# Patient Record
Sex: MALE | Race: WHITE | NOT HISPANIC OR LATINO | URBAN - METROPOLITAN AREA
[De-identification: names, ages, dates, MRNs, and addresses within clinical notes are randomized per-mention and may not be internally consistent; named-entity substitution may affect disease eponyms.]

---

## 2021-06-11 ENCOUNTER — IMMUNIZATION (OUTPATIENT)
Dept: PRIMARY CARE CLINIC | Facility: CLINIC | Age: 46
End: 2021-06-11

## 2021-06-11 DIAGNOSIS — Z23 NEED FOR VACCINATION: Primary | ICD-10-CM

## 2021-06-11 PROCEDURE — 91300 COVID-19, MRNA, LNP-S, PF, 30 MCG/0.3 ML DOSE VACCINE: CPT | Mod: S$GLB,,, | Performed by: FAMILY MEDICINE

## 2021-06-11 PROCEDURE — 0001A COVID-19, MRNA, LNP-S, PF, 30 MCG/0.3 ML DOSE VACCINE: ICD-10-PCS | Mod: CV19,S$GLB,, | Performed by: FAMILY MEDICINE

## 2021-06-11 PROCEDURE — 0001A COVID-19, MRNA, LNP-S, PF, 30 MCG/0.3 ML DOSE VACCINE: CPT | Mod: CV19,S$GLB,, | Performed by: FAMILY MEDICINE

## 2021-06-11 PROCEDURE — 91300 COVID-19, MRNA, LNP-S, PF, 30 MCG/0.3 ML DOSE VACCINE: ICD-10-PCS | Mod: S$GLB,,, | Performed by: FAMILY MEDICINE

## 2021-07-02 ENCOUNTER — IMMUNIZATION (OUTPATIENT)
Dept: PRIMARY CARE CLINIC | Facility: CLINIC | Age: 46
End: 2021-07-02

## 2021-07-02 DIAGNOSIS — Z23 NEED FOR VACCINATION: Primary | ICD-10-CM

## 2021-07-02 PROCEDURE — 91300 COVID-19, MRNA, LNP-S, PF, 30 MCG/0.3 ML DOSE VACCINE: ICD-10-PCS | Mod: S$GLB,,, | Performed by: FAMILY MEDICINE

## 2021-07-02 PROCEDURE — 91300 COVID-19, MRNA, LNP-S, PF, 30 MCG/0.3 ML DOSE VACCINE: CPT | Mod: S$GLB,,, | Performed by: FAMILY MEDICINE

## 2021-07-02 PROCEDURE — 0002A COVID-19, MRNA, LNP-S, PF, 30 MCG/0.3 ML DOSE VACCINE: ICD-10-PCS | Mod: CV19,S$GLB,, | Performed by: FAMILY MEDICINE

## 2021-07-02 PROCEDURE — 0002A COVID-19, MRNA, LNP-S, PF, 30 MCG/0.3 ML DOSE VACCINE: CPT | Mod: CV19,S$GLB,, | Performed by: FAMILY MEDICINE

## 2024-02-12 ENCOUNTER — OFFICE VISIT (OUTPATIENT)
Dept: ORTHOPEDICS | Facility: CLINIC | Age: 49
End: 2024-02-12
Payer: COMMERCIAL

## 2024-02-12 ENCOUNTER — HOSPITAL ENCOUNTER (OUTPATIENT)
Dept: RADIOLOGY | Facility: HOSPITAL | Age: 49
Discharge: HOME | End: 2024-02-12
Attending: STUDENT IN AN ORGANIZED HEALTH CARE EDUCATION/TRAINING PROGRAM
Payer: COMMERCIAL

## 2024-02-12 VITALS — BODY MASS INDEX: 35 KG/M2 | WEIGHT: 250 LBS | HEIGHT: 71 IN

## 2024-02-12 DIAGNOSIS — M79.641 BILATERAL HAND PAIN: Primary | ICD-10-CM

## 2024-02-12 DIAGNOSIS — G56.01 CARPAL TUNNEL SYNDROME OF RIGHT WRIST: Primary | ICD-10-CM

## 2024-02-12 DIAGNOSIS — M79.641 BILATERAL HAND PAIN: ICD-10-CM

## 2024-02-12 DIAGNOSIS — M79.642 BILATERAL HAND PAIN: ICD-10-CM

## 2024-02-12 DIAGNOSIS — G56.03 BILATERAL CARPAL TUNNEL SYNDROME: ICD-10-CM

## 2024-02-12 DIAGNOSIS — M79.642 BILATERAL HAND PAIN: Primary | ICD-10-CM

## 2024-02-12 PROCEDURE — 99999 PR PBB SHADOW E&M-EST. PATIENT-LVL IV: CPT | Mod: PBBFAC,,, | Performed by: STUDENT IN AN ORGANIZED HEALTH CARE EDUCATION/TRAINING PROGRAM

## 2024-02-12 PROCEDURE — 73130 X-RAY EXAM OF HAND: CPT | Mod: 26,50,, | Performed by: RADIOLOGY

## 2024-02-12 PROCEDURE — 73130 X-RAY EXAM OF HAND: CPT | Mod: TC,50

## 2024-02-12 PROCEDURE — 99204 OFFICE O/P NEW MOD 45 MIN: CPT | Mod: S$GLB,,, | Performed by: STUDENT IN AN ORGANIZED HEALTH CARE EDUCATION/TRAINING PROGRAM

## 2024-02-12 RX ORDER — IBUPROFEN 200 MG
200 TABLET ORAL EVERY 6 HOURS PRN
Status: ON HOLD | COMMUNITY
End: 2024-04-23 | Stop reason: HOSPADM

## 2024-02-13 RX ORDER — SODIUM CHLORIDE 9 MG/ML
INJECTION, SOLUTION INTRAVENOUS CONTINUOUS
Status: CANCELLED | OUTPATIENT
Start: 2024-02-13

## 2024-02-13 RX ORDER — LIDOCAINE HYDROCHLORIDE 10 MG/ML
1 INJECTION, SOLUTION EPIDURAL; INFILTRATION; INTRACAUDAL; PERINEURAL ONCE
Status: CANCELLED | OUTPATIENT
Start: 2024-02-13 | End: 2024-02-13

## 2024-02-13 NOTE — PROGRESS NOTES
Hand Surgery Clinic Note    Chief Complaint  Chief Complaint   Patient presents with    Left Hand - Pain, Numbness    Right Hand - Pain, Numbness       History of Present Illness  48-year-old right-hand dominant male who has a  at [employer] presents for evaluation of bilateral hand pain and numbness, right greater than left.  Symptoms have taken place for approximately 3 months.  No history of injury.  Initially the symptoms started at night only.  Patient would experience pain which woke him up from sleep at night and he would have to shake his hand out.  He tried wearing a brace on the right side at night as this was the more symptomatic side but this did not help much.  He saw Dr. Monae, a generalist on the [place], and received bilateral carpal tunnel steroid injections on 11/29/2023.  These injections provided relief from pain for 5 weeks.  He is now beginning to experience numbness in his hands during the daytime with certain activities such as driving and holding his phone.  He does not experience numbness while at rest.  The numbness is particularly in the index, middle, and ring fingers bilaterally.  Of note, he does have a history of chronic neck pain issues for several years.  No radicular pain symptoms.  No history of diabetes.    Review of Systems  Review of systems negative for chest pain, shortness of breath, fevers, chills, nausea/vomiting.    Past Medical History  No known medical problems    Past Surgical History  No previous surgeries    Allergies  Review of patient's allergies indicates:  No Known Allergies    Family History  History reviewed. No pertinent family history.    Social History  Social History     Socioeconomic History    Marital status:    Tobacco Use    Smoking status: Former     Types: Cigarettes     Passive exposure: Past    Smokeless tobacco: Never       Vital Signs  There were no vitals filed for this visit.    Physical Exam  Constitutional: Appears  well-developed and well-nourished. No distress.   HENT:   Head: Normocephalic.   Eyes: EOM are normal.   Pulmonary/Chest: Effort normal.   Neurological: Oriented to person, place, and time.   Psychiatric: Normal mood and affect.     Right Upper Extremity:  No abrasions, lacerations, wounds.  No swelling.  No erythema.  Full active wrist flexion and extension.  Full pronation and supination. Positive Tinel's in the median nerve distribution at the wrist.  Positive Phalen's.  Negative Tinel's in the ulnar nerve distribution at the elbow.  No ulnar nerve subluxation with elbow flexion.  No thenar or FDI atrophy.  5/5 apb strength.  5/5 FDI strength.  Two-point discrimination is 5 mm in all 5 fingers.  Patient is able to make a fist and extend all her fingers.  No tenderness over the A1 pulleys of all 5 fingers.  No triggering with range of motion.  No midline or paraspinal tenderness along the cervical spine.  Negative Spurling's.  Normoreflexic biceps, triceps, brachioradialis.  Negative Elinor's.  Palpable radial pulse.    Left Upper Extremity:  No abrasions, lacerations, wounds.  No swelling.  No erythema.  Full active wrist flexion and extension.  Full pronation and supination. Positive Tinel's in the median nerve distribution at the wrist.  Positive Phalen's.  Negative Tinel's in the ulnar nerve distribution at the elbow.  No ulnar nerve subluxation with elbow flexion.  No thenar or FDI atrophy.  5/5 apb strength.  5/5 FDI strength.  Two-point discrimination is 5 mm in all 5 fingers.  Patient is able to make a fist and extend all her fingers.  No tenderness over the A1 pulleys of all 5 fingers.  No triggering with range of motion.  No midline or paraspinal tenderness along the cervical spine.  Negative Spurling's.  Normoreflexic biceps, triceps, brachioradialis.  Negative Elinor's.  Palpable radial pulse.    Imaging  Bilateral hand x-rays three views were obtained today and independently reviewed by myself.   No evidence of arthritic changes noted throughout the carpus and MCP joints.  Mild-to-moderate arthritic changes are noted at the index finger DIPJ joints bilaterally.  No fracture.  No dislocation or subluxation.  No foreign body.    Assessment and Plan  48-year-old right-hand dominant male presents with bilateral carpal tunnel syndrome for 3 months.  The right side is more symptomatic than the left.  Symptoms initially started as nighttime only but I have progress to daytime numbness with certain activities.  Patient has previously been treated with bracing and injections.  The injections helped for about 5 weeks but the symptoms have returned.  I discussed potential treatment options including repeat injections versus carpal tunnel release surgery.  For both potential options, I would like to wait 90 days following his most recent injection to proceed.  Given that this last injection only helped for 5 weeks, patient would rather proceed with carpal tunnel release surgery than another injection.  We will plan for right carpal tunnel release surgery 1st as this is the side that is more symptomatic.  I discussed anesthetic options including MAC/local versus local only.  Patient elected to proceed with local only.  Risks and benefits of right carpal tunnel release surgery were discussed.  In particular, it was noted that carpal tunnel release surgery outcomes can be unpredictable.  Specifically, it was noted that numbness in the fingers may never resolve or may take more than a year to achieve gradual (and often incomplete) improvement.  It was noted that sometimes the only benefit of the surgery is to prevent significant worsening of weakness or symptoms of nerve compression.  It was noted that any muscular weakness or atrophy due to long-standing carpal tunnel syndrome is generally expected not to improve after the surgery.  It was noted that sometimes re-operation is needed to address new symptoms after the  surgery or complications such as damage to normal structures within the field of surgery.  It was noted that normal structures within the field of surgery that can be damaged include the median nerve and the nine flexor tendons to the fingers.  Consent was signed for right carpal tunnel release.  Surgery was booked for 04/26/2024 under local.      Allie Esparza MD  Orthopaedic Hand Surgery

## 2024-04-16 ENCOUNTER — PATIENT MESSAGE (OUTPATIENT)
Dept: PREADMISSION TESTING | Facility: HOSPITAL | Age: 49
End: 2024-04-16
Payer: COMMERCIAL

## 2024-04-22 ENCOUNTER — PATIENT MESSAGE (OUTPATIENT)
Dept: PREADMISSION TESTING | Facility: HOSPITAL | Age: 49
End: 2024-04-22
Payer: COMMERCIAL

## 2024-04-22 ENCOUNTER — LAB VISIT (OUTPATIENT)
Dept: LAB | Facility: HOSPITAL | Age: 49
End: 2024-04-22
Attending: NURSE PRACTITIONER
Payer: COMMERCIAL

## 2024-04-22 DIAGNOSIS — Z01.818 PREOP TESTING: ICD-10-CM

## 2024-04-22 LAB
ALBUMIN SERPL BCP-MCNC: 3.9 G/DL (ref 3.5–5.2)
ALP SERPL-CCNC: 60 U/L (ref 55–135)
ALT SERPL W/O P-5'-P-CCNC: 30 U/L (ref 10–44)
ANION GAP SERPL CALC-SCNC: 9 MMOL/L (ref 8–16)
AST SERPL-CCNC: 24 U/L (ref 10–40)
BILIRUB SERPL-MCNC: 0.5 MG/DL (ref 0.1–1)
BUN SERPL-MCNC: 9 MG/DL (ref 6–20)
CALCIUM SERPL-MCNC: 9.4 MG/DL (ref 8.7–10.5)
CHLORIDE SERPL-SCNC: 106 MMOL/L (ref 95–110)
CO2 SERPL-SCNC: 26 MMOL/L (ref 23–29)
CREAT SERPL-MCNC: 0.8 MG/DL (ref 0.5–1.4)
ERYTHROCYTE [DISTWIDTH] IN BLOOD BY AUTOMATED COUNT: 13 % (ref 11.5–14.5)
EST. GFR  (NO RACE VARIABLE): >60 ML/MIN/1.73 M^2
GLUCOSE SERPL-MCNC: 86 MG/DL (ref 70–110)
HCT VFR BLD AUTO: 47.4 % (ref 40–54)
HGB BLD-MCNC: 16.2 G/DL (ref 14–18)
MCH RBC QN AUTO: 34.8 PG (ref 27–31)
MCHC RBC AUTO-ENTMCNC: 34.2 G/DL (ref 32–36)
MCV RBC AUTO: 102 FL (ref 82–98)
PLATELET # BLD AUTO: 205 K/UL (ref 150–450)
PMV BLD AUTO: 10.7 FL (ref 9.2–12.9)
POTASSIUM SERPL-SCNC: 3.8 MMOL/L (ref 3.5–5.1)
PROT SERPL-MCNC: 7.3 G/DL (ref 6–8.4)
RBC # BLD AUTO: 4.65 M/UL (ref 4.6–6.2)
SODIUM SERPL-SCNC: 141 MMOL/L (ref 136–145)
WBC # BLD AUTO: 8.53 K/UL (ref 3.9–12.7)

## 2024-04-22 PROCEDURE — 80053 COMPREHEN METABOLIC PANEL: CPT | Performed by: NURSE PRACTITIONER

## 2024-04-22 PROCEDURE — 36415 COLL VENOUS BLD VENIPUNCTURE: CPT | Mod: PN | Performed by: NURSE PRACTITIONER

## 2024-04-22 PROCEDURE — 85027 COMPLETE CBC AUTOMATED: CPT | Performed by: NURSE PRACTITIONER

## 2024-04-23 ENCOUNTER — HOSPITAL ENCOUNTER (OUTPATIENT)
Facility: HOSPITAL | Age: 49
Discharge: HOME | End: 2024-04-23
Attending: STUDENT IN AN ORGANIZED HEALTH CARE EDUCATION/TRAINING PROGRAM | Admitting: STUDENT IN AN ORGANIZED HEALTH CARE EDUCATION/TRAINING PROGRAM
Payer: COMMERCIAL

## 2024-04-23 VITALS
DIASTOLIC BLOOD PRESSURE: 68 MMHG | HEART RATE: 67 BPM | SYSTOLIC BLOOD PRESSURE: 100 MMHG | RESPIRATION RATE: 16 BRPM | WEIGHT: 254.44 LBS | TEMPERATURE: 98 F | OXYGEN SATURATION: 95 % | BODY MASS INDEX: 35.62 KG/M2 | HEIGHT: 71 IN

## 2024-04-23 DIAGNOSIS — G56.01 CARPAL TUNNEL SYNDROME OF RIGHT WRIST: ICD-10-CM

## 2024-04-23 DIAGNOSIS — Z01.818 PREOP TESTING: Primary | ICD-10-CM

## 2024-04-23 PROCEDURE — 63600175 PHARM REV CODE 636 W HCPCS: Mod: JZ,JG | Performed by: STUDENT IN AN ORGANIZED HEALTH CARE EDUCATION/TRAINING PROGRAM

## 2024-04-23 PROCEDURE — 63600175 PHARM REV CODE 636 W HCPCS

## 2024-04-23 PROCEDURE — 71000015 HC POSTOP RECOV 1ST HR: Performed by: STUDENT IN AN ORGANIZED HEALTH CARE EDUCATION/TRAINING PROGRAM

## 2024-04-23 PROCEDURE — 64721 CARPAL TUNNEL SURGERY: CPT | Mod: RT,,, | Performed by: STUDENT IN AN ORGANIZED HEALTH CARE EDUCATION/TRAINING PROGRAM

## 2024-04-23 PROCEDURE — 25000003 PHARM REV CODE 250: Performed by: STUDENT IN AN ORGANIZED HEALTH CARE EDUCATION/TRAINING PROGRAM

## 2024-04-23 PROCEDURE — 36000706: Performed by: STUDENT IN AN ORGANIZED HEALTH CARE EDUCATION/TRAINING PROGRAM

## 2024-04-23 PROCEDURE — 36000707: Performed by: STUDENT IN AN ORGANIZED HEALTH CARE EDUCATION/TRAINING PROGRAM

## 2024-04-23 RX ORDER — SODIUM CHLORIDE 9 MG/ML
INJECTION, SOLUTION INTRAVENOUS CONTINUOUS
Status: DISCONTINUED | OUTPATIENT
Start: 2024-04-23 | End: 2024-04-23 | Stop reason: HOSPADM

## 2024-04-23 RX ORDER — LIDOCAINE HYDROCHLORIDE 10 MG/ML
INJECTION, SOLUTION EPIDURAL; INFILTRATION; INTRACAUDAL; PERINEURAL
Status: DISCONTINUED
Start: 2024-04-23 | End: 2024-04-23 | Stop reason: WASHOUT

## 2024-04-23 RX ORDER — BACITRACIN ZINC 500 [USP'U]/G
OINTMENT TOPICAL
Status: DISCONTINUED
Start: 2024-04-23 | End: 2024-04-23 | Stop reason: HOSPADM

## 2024-04-23 RX ORDER — LIDOCAINE HYDROCHLORIDE 10 MG/ML
1 INJECTION, SOLUTION EPIDURAL; INFILTRATION; INTRACAUDAL; PERINEURAL ONCE
Status: DISCONTINUED | OUTPATIENT
Start: 2024-04-23 | End: 2024-04-23 | Stop reason: HOSPADM

## 2024-04-23 RX ORDER — BUPIVACAINE HYDROCHLORIDE 2.5 MG/ML
INJECTION, SOLUTION EPIDURAL; INFILTRATION; INTRACAUDAL
Status: DISCONTINUED
Start: 2024-04-23 | End: 2024-04-23 | Stop reason: HOSPADM

## 2024-04-23 RX ORDER — CEFAZOLIN 2 G/1
INJECTION, POWDER, FOR SOLUTION INTRAMUSCULAR; INTRAVENOUS
Status: COMPLETED
Start: 2024-04-23 | End: 2024-04-23

## 2024-04-23 RX ORDER — TRAMADOL HYDROCHLORIDE 50 MG/1
50 TABLET ORAL EVERY 8 HOURS PRN
Qty: 5 TABLET | Refills: 0 | Status: SHIPPED | OUTPATIENT
Start: 2024-04-23

## 2024-04-23 RX ORDER — ACETAMINOPHEN 500 MG
500 TABLET ORAL EVERY 8 HOURS PRN
Qty: 30 TABLET | Refills: 0 | Status: SHIPPED | OUTPATIENT
Start: 2024-04-23

## 2024-04-23 RX ORDER — NAPROXEN 500 MG/1
500 TABLET ORAL EVERY 8 HOURS PRN
Qty: 30 TABLET | Refills: 0 | Status: SHIPPED | OUTPATIENT
Start: 2024-04-23

## 2024-04-23 RX ORDER — LIDOCAINE HYDROCHLORIDE 10 MG/ML
INJECTION, SOLUTION EPIDURAL; INFILTRATION; INTRACAUDAL; PERINEURAL
Status: DISCONTINUED | OUTPATIENT
Start: 2024-04-23 | End: 2024-04-23 | Stop reason: HOSPADM

## 2024-04-23 RX ORDER — BUPIVACAINE HYDROCHLORIDE 2.5 MG/ML
INJECTION, SOLUTION EPIDURAL; INFILTRATION; INTRACAUDAL
Status: DISCONTINUED | OUTPATIENT
Start: 2024-04-23 | End: 2024-04-23 | Stop reason: HOSPADM

## 2024-04-23 RX ORDER — LIDOCAINE HYDROCHLORIDE 10 MG/ML
INJECTION, SOLUTION EPIDURAL; INFILTRATION; INTRACAUDAL; PERINEURAL
Status: DISCONTINUED
Start: 2024-04-23 | End: 2024-04-23 | Stop reason: HOSPADM

## 2024-04-23 RX ADMIN — CEFAZOLIN 2 G: 2 INJECTION, POWDER, FOR SOLUTION INTRAMUSCULAR; INTRAVENOUS at 12:04

## 2024-04-23 NOTE — H&P
Hand Surgery History and Physical Note     Chief Complaint      Chief Complaint   Patient presents with    Left Hand - Pain, Numbness    Right Hand - Pain, Numbness         History of Present Illness  48-year-old right-hand dominant male who has a  at [employer] presents for evaluation of bilateral hand pain and numbness, right greater than left.  Symptoms have taken place for approximately 3 months.  No history of injury.  Initially the symptoms started at night only.  Patient would experience pain which woke him up from sleep at night and he would have to shake his hand out.  He tried wearing a brace on the right side at night as this was the more symptomatic side but this did not help much.  He saw Dr. Monae, a generalist on [facility], and received bilateral carpal tunnel steroid injections on 11/29/2023.  These injections provided relief from pain for 5 weeks.  He is now beginning to experience numbness in his hands during the daytime with certain activities such as driving and holding his phone.  He does not experience numbness while at rest.  The numbness is particularly in the index, middle, and ring fingers bilaterally.  Of note, he does have a history of chronic neck pain issues for several years.  No radicular pain symptoms.  No history of diabetes.     Review of Systems  Review of systems negative for chest pain, shortness of breath, fevers, chills, nausea/vomiting.     Past Medical History  No known medical problems     Past Surgical History  No previous surgeries     Allergies  Review of patient's allergies indicates:  No Known Allergies     Family History  History reviewed. No pertinent family history.     Social History  Social History            Socioeconomic History    Marital status:    Tobacco Use    Smoking status: Former       Types: Cigarettes       Passive exposure: Past    Smokeless tobacco: Never         Vital Signs  There were no vitals filed for this visit.     Physical  Exam  Constitutional: Appears well-developed and well-nourished. No distress.   HENT:   Head: Normocephalic.   Eyes: EOM are normal.   Pulmonary/Chest: Effort normal.   Neurological: Oriented to person, place, and time.   Psychiatric: Normal mood and affect.      Right Upper Extremity:  No abrasions, lacerations, wounds.  No swelling.  No erythema.  Full active wrist flexion and extension.  Full pronation and supination. Positive Tinel's in the median nerve distribution at the wrist.  Positive Phalen's.  Negative Tinel's in the ulnar nerve distribution at the elbow.  No ulnar nerve subluxation with elbow flexion.  No thenar or FDI atrophy.  5/5 apb strength.  5/5 FDI strength.  Two-point discrimination is 5 mm in all 5 fingers.  Patient is able to make a fist and extend all her fingers.  No tenderness over the A1 pulleys of all 5 fingers.  No triggering with range of motion.  No midline or paraspinal tenderness along the cervical spine.  Negative Spurling's.  Normoreflexic biceps, triceps, brachioradialis.  Negative Elinor's.  Palpable radial pulse.     Left Upper Extremity:  No abrasions, lacerations, wounds.  No swelling.  No erythema.  Full active wrist flexion and extension.  Full pronation and supination. Positive Tinel's in the median nerve distribution at the wrist.  Positive Phalen's.  Negative Tinel's in the ulnar nerve distribution at the elbow.  No ulnar nerve subluxation with elbow flexion.  No thenar or FDI atrophy.  5/5 apb strength.  5/5 FDI strength.  Two-point discrimination is 5 mm in all 5 fingers.  Patient is able to make a fist and extend all her fingers.  No tenderness over the A1 pulleys of all 5 fingers.  No triggering with range of motion.  No midline or paraspinal tenderness along the cervical spine.  Negative Spurling's.  Normoreflexic biceps, triceps, brachioradialis.  Negative Elinor's.  Palpable radial pulse.     Imaging  Bilateral hand x-rays three views were obtained today and  independently reviewed by myself.  No evidence of arthritic changes noted throughout the carpus and MCP joints.  Mild-to-moderate arthritic changes are noted at the index finger DIPJ joints bilaterally.  No fracture.  No dislocation or subluxation.  No foreign body.     Assessment and Plan  48-year-old right-hand dominant male presents with bilateral carpal tunnel syndrome for 3 months.  The right side is more symptomatic than the left.  Symptoms initially started as nighttime only but I have progress to daytime numbness with certain activities.  Patient has previously been treated with bracing and injections.  The injections helped for about 5 weeks but the symptoms have returned.  I discussed potential treatment options including repeat injections versus carpal tunnel release surgery.  For both potential options, I would like to wait 90 days following his most recent injection to proceed.  Given that this last injection only helped for 5 weeks, patient would rather proceed with carpal tunnel release surgery than another injection.  We will plan for right carpal tunnel release surgery 1st as this is the side that is more symptomatic.  I discussed anesthetic options including MAC/local versus local only.  Patient elected to proceed with local only.  Risks and benefits of right carpal tunnel release surgery were discussed.  In particular, it was noted that carpal tunnel release surgery outcomes can be unpredictable.  Specifically, it was noted that numbness in the fingers may never resolve or may take more than a year to achieve gradual (and often incomplete) improvement.  It was noted that sometimes the only benefit of the surgery is to prevent significant worsening of weakness or symptoms of nerve compression.  It was noted that any muscular weakness or atrophy due to long-standing carpal tunnel syndrome is generally expected not to improve after the surgery.  It was noted that sometimes re-operation is needed to  address new symptoms after the surgery or complications such as damage to normal structures within the field of surgery.  It was noted that normal structures within the field of surgery that can be damaged include the median nerve and the nine flexor tendons to the fingers.  Consent was signed for right carpal tunnel release.  Surgery was booked for 04/26/2024 under local.        Allie Esparza MD  Orthopaedic Hand Surgery

## 2024-04-23 NOTE — DISCHARGE SUMMARY
[facility]  Discharge Note  Short Stay    Procedure(s) (LRB):  RELEASE, CARPAL TUNNEL (Right)      OUTCOME: Patient tolerated treatment/procedure well without complication and is now ready for discharge.    DISPOSITION: Home or Self Care    FINAL DIAGNOSIS:  Right carpal tunnel release    FOLLOWUP: In clinic    DISCHARGE INSTRUCTIONS:    Discharge Procedure Orders   CBC Without Differential   Standing Status: Future Number of Occurrences: 1 Standing Exp. Date: 04/20/25     COMPREHENSIVE METABOLIC PANEL   Standing Status: Future Number of Occurrences: 1 Standing Exp. Date: 04/20/25     Diet Adult Regular     Leave dressing on - Keep it clean, dry, and intact until clinic visit     Weight bearing restrictions (specify):   Order Comments: No lifting > 2 lb to the operative extremity. Work on gentle finger range of motion.         Clinical Reference Documents Added to Patient Instructions         Document    CARPAL TUNNEL RELEASE (ENGLISH)            TIME SPENT ON DISCHARGE: 5 minutes

## 2024-04-23 NOTE — OP NOTE
[facility]  Orthopaedic Hand Surgery  Operative Note    SUMMARY     Date of Procedure: 4/23/2024     Procedure: 03406 Right carpal tunnel release       Surgeons and Role:     * Allie Esparza MD - Primary    Assistant: None    Pre-Operative Diagnosis: Carpal tunnel syndrome of right wrist [G56.01]    Post-Operative Diagnosis: Post-Op Diagnosis Codes:     * Carpal tunnel syndrome of right wrist [G56.01]    Anesthesia: Local    Indication for Procedure:  48 year old right hand dominant male presented to clinic with findings of bilateral carpal tunnel syndrome, right worse than left. Symptoms had taken place for 3 months. Risks and benefits of operative versus nonoperative treatment were discussed with the patient. He elected to proceed with right carpal tunnel release surgery under local. Consent was obtained.    Operative Findings (including complications, if any): thickened transverse carpal ligament    Description of Technical Procedures:   The patient was brought to the operating room and laid supine.  A tourniquet was placed at the right upper arm. Preoperative time out was performed with confirmation of correct patient, side, and site, identification of all personnel, confirmation of administration of preoperative antibiotic, and confirmation of all needed equipment.  10cc of lidocaine 1% without epinephrine mixed with 0.25% marcaine was injected for local anesthetic. The arm prepped with alcohol/chloraprep and draped in the usual sterile surgical fashion.  When this was completed, I proceeded with the surgery.     An esmarch was used to exsanguinate the limb and the tourniquet was raised to 200 mm Hg. A skin marker was used to tera the relevant landmarks.   A 15- blade was used to make the incision.  The incision began just distal to the distal wrist crease in line with the radial border of the ring finger. The incision was made through the skin, subcutaneous tissue, palmar fascia down to  the transverse carpal ligament. Under direct visualization, the transverse carpal ligament was identified and incised just radial to the hook of the hamate. I sharply incised the entirety of the transverse carpal ligament distally under direct visualization just past the fat surrounding the superficial palmar arch. I then incised the proximal portion of the transverse carpal ligament to and well across the wrist crease to include the distal forearm fascia.  The ligament was sharply divided with care given to avoiding injury to adjacent neurovascular structures.  I were able to confirm a complete release proximally and distally by direct visualization and by palpation- there were no constrictive elements affecting the nerve.  The wound was copiously irrigated and closed with a 4-0 nylon in a horizontal mattress fashion.  The tourniquet was let down. Total tourniquet time was 8 minutes. Hemostasis was obtained. Bacitracin, adaptic, 4x4s, webril, and coban were applied.  All sponge, needle, and instrument counts were correct at the conclusion of the procedure.  The patient was awakened and taken to the recovery room in stable condition.    Estimated Blood Loss (EBL): 3cc           Implants: * No implants in log *    Specimens:   Specimen (24h ago, onward)      None                    Condition: Good    Disposition: PACU - hemodynamically stable.    Attestation: I was present and scrubbed for the entire procedure.    Allie Esparza MD  Orthopaedic Hand Surgery

## 2024-05-08 ENCOUNTER — OFFICE VISIT (OUTPATIENT)
Dept: ORTHOPEDICS | Facility: CLINIC | Age: 49
End: 2024-05-08
Payer: COMMERCIAL

## 2024-05-08 VITALS — BODY MASS INDEX: 35.62 KG/M2 | WEIGHT: 254.44 LBS | HEIGHT: 71 IN

## 2024-05-08 DIAGNOSIS — Z98.890 POSTOPERATIVE STATE: Primary | ICD-10-CM

## 2024-05-08 PROCEDURE — 99999 PR PBB SHADOW E&M-EST. PATIENT-LVL III: CPT | Mod: PBBFAC,,, | Performed by: STUDENT IN AN ORGANIZED HEALTH CARE EDUCATION/TRAINING PROGRAM

## 2024-05-08 PROCEDURE — 99024 POSTOP FOLLOW-UP VISIT: CPT | Mod: S$GLB,,, | Performed by: STUDENT IN AN ORGANIZED HEALTH CARE EDUCATION/TRAINING PROGRAM

## 2024-05-08 NOTE — PROGRESS NOTES
Hand Surgery Clinic Postop Note    Surgery Date: 4/23/24  Surgery: Right carpal tunnel release    Postoperative Course:  Patient is doing great following surgery.  Pain level is a 3/10.  Patient is no longer experiencing pain which wakes him up from sleep at night.  He was no longer experiencing numbness in his hands either at night or during the daytime.  No fevers or chills.  He has been taking over-the-counter medications as needed for pain control.  He was kept his dressing clean and dry.    Vital Signs  There were no vitals filed for this visit.    Physical Exam  General - NAD  Resp - nonlabored breathing  CV - RR by RP    Right Upper Extremity:  Dressing is clean, dry, intact.  Dressing removed for exam.  Carpal tunnel incision is well healed.  Nylon suture in place.  No swelling.  No erythema.  No drainage.  Full active wrist flexion and extension.  Full pronation and supination.  Patient was able to make a fist and extend all his fingers.  Two-point discrimination is 5 mm in all 5 fingers.  Palpable radial pulse.    Imaging  No new imaging obtained today.    Assessment and Plan  48-year-old male is 2 weeks status post right carpal tunnel release.  He was doing great.  Sutures removed in clinic today.  Okay to shower.  At this point he can progress to activity as tolerated.  No restrictions.  I discussed that the scar may become a little bit hard/rigid over the next few weeks; if this occurs, I discussed that he can massage the scar to help with this issue and that it should soften up over time.  Follow up in clinic in 4 weeks for re-evaluation.    Allie Esparza MD  Orthopaedic Hand Surgery

## 2024-05-20 ENCOUNTER — PATIENT MESSAGE (OUTPATIENT)
Dept: ORTHOPEDICS | Facility: CLINIC | Age: 49
End: 2024-05-20
Payer: COMMERCIAL

## 2024-06-05 ENCOUNTER — OFFICE VISIT (OUTPATIENT)
Dept: ORTHOPEDICS | Facility: CLINIC | Age: 49
End: 2024-06-05
Payer: COMMERCIAL

## 2024-06-05 VITALS — BODY MASS INDEX: 35.62 KG/M2 | WEIGHT: 254.44 LBS | HEIGHT: 71 IN

## 2024-06-05 DIAGNOSIS — G56.22 CUBITAL TUNNEL SYNDROME ON LEFT: Primary | ICD-10-CM

## 2024-06-05 DIAGNOSIS — Z01.818 PREOPERATIVE TESTING: ICD-10-CM

## 2024-06-05 DIAGNOSIS — G56.02 CARPAL TUNNEL SYNDROME OF LEFT WRIST: ICD-10-CM

## 2024-06-05 DIAGNOSIS — Z98.890 POSTOPERATIVE STATE: ICD-10-CM

## 2024-06-05 PROCEDURE — 99999 PR PBB SHADOW E&M-EST. PATIENT-LVL III: CPT | Mod: PBBFAC,,, | Performed by: STUDENT IN AN ORGANIZED HEALTH CARE EDUCATION/TRAINING PROGRAM

## 2024-06-05 PROCEDURE — 99214 OFFICE O/P EST MOD 30 MIN: CPT | Mod: 24,S$GLB,, | Performed by: STUDENT IN AN ORGANIZED HEALTH CARE EDUCATION/TRAINING PROGRAM

## 2024-06-05 RX ORDER — SODIUM CHLORIDE 9 MG/ML
INJECTION, SOLUTION INTRAVENOUS CONTINUOUS
OUTPATIENT
Start: 2024-06-05

## 2024-06-05 RX ORDER — LIDOCAINE HYDROCHLORIDE 10 MG/ML
1 INJECTION, SOLUTION EPIDURAL; INFILTRATION; INTRACAUDAL; PERINEURAL ONCE
OUTPATIENT
Start: 2024-06-05 | End: 2024-06-05

## 2024-06-05 RX ORDER — CEFAZOLIN SODIUM 2 G/50ML
2 SOLUTION INTRAVENOUS
OUTPATIENT
Start: 2024-06-05

## 2024-06-05 NOTE — LETTER
June 5, 2024    Gabe Linn  [id number] [place] [id number]         [facility]  [address] [address] [facility]  [place] [address]  Phone: [phone]  Fax: [phone] June 5, 2024     Patient: Gabe Linn   YOB: 1975   Date of Visit: 6/5/2024       To Whom It May Concern:    Gabe Linn was seen on 6/5/2024, he may return to work for full duty, with no restrictions.    If you have any questions or concerns, please don't hesitate to call.    Sincerely,        Allie Esparza MD

## 2024-06-05 NOTE — PROGRESS NOTES
Hand Surgery Clinic Note    Chief Complaint  Chief Complaint   Patient presents with    Right Hand - Post-op Evaluation    Left hand pain and numbness       History of Present Illness  Surgery Date: 4/23/24  Surgery: Right carpal tunnel release    48-year-old right-hand dominant male presents for follow up.  He underwent right carpal tunnel release 6 weeks and 1 day ago.  With regard to his right-hand, he was doing great.  He was returned to full function at work and it was not having any issues.  He does not have any numbness in the right hand.  He does not have any night pain in the right hand.  No fevers or chills.    He would like to be evaluated for left hand pain and numbness today.  He has numbness in all 5 fingers which wakes him up every night.  Symptoms have taken place for approximately 7 months.  He received bilateral carpal tunnel steroid injections on 11/29/2023 by another physician.  These injections improved his symptoms for 5 weeks but they subsequently returned.  He will sometimes experience numbness in his left hand during the daytime with certain activities such as driving or holding his from.  It was not experience numbness while at rest.    Review of Systems  Review of systems negative for chest pain, shortness of breath, fevers, chills, nausea/vomiting.    Past Medical History  History reviewed. No pertinent past medical history.    Past Surgical History  Past Surgical History:   Procedure Laterality Date    CARPAL TUNNEL RELEASE Right 4/23/2024    Procedure: RELEASE, CARPAL TUNNEL;  Surgeon: Allie Esparza MD;  Location: [facility];  Service: Orthopedics;  Laterality: Right;    CYST REMOVAL         Allergies  Review of patient's allergies indicates:  No Known Allergies    Family History  Family History   Problem Relation Name Age of Onset    Cancer Mother      Cancer Father         Social History  Social History     Socioeconomic History    Marital status:    Tobacco Use    Smoking  status: Former     Types: Cigarettes     Passive exposure: Past    Smokeless tobacco: Never   Substance and Sexual Activity    Alcohol use: Not Currently    Drug use: Never       Vital Signs  There were no vitals filed for this visit.    Physical Exam  Constitutional: Appears well-developed and well-nourished. No distress.   HENT:   Head: Normocephalic.   Eyes: EOM are normal.   Pulmonary/Chest: Effort normal.   Neurological: Oriented to person, place, and time.   Psychiatric: Normal mood and affect.     Right Upper Extremity:  Incision is well healed.  No drainage.  No erythema.  No swelling.  No tenderness over the incision.  Full active wrist flexion and extension.  Full pronation and supination.  Patient is able to make a fist and extend all his fingers.  Two-point discrimination is 5 mm in all 5 fingers.  Palpable radial pulse.    Left Upper Extremity:  No abrasions, lacerations, wounds.  No swelling.  No erythema.  Full active elbow flexion and extension. Full active wrist flexion and extension.  Full pronation and supination. Positive Tinel's in the median nerve distribution at the wrist.  Positive Phalen's.  Positive Tinel's in the ulnar nerve distribution at the elbow.  No ulnar nerve subluxation with elbow flexion.  Positive elbow flexion test. No thenar or FDI atrophy.  5/5 apb strength.  5/5 FDI strength.  Two-point discrimination: 7/7/9/5/9.  Patient is able to make a fist and extend all his fingers.  No tenderness over the A1 pulleys of all 5 fingers.  No triggering with range of motion.  Negative froments. Negative Wartenbergs. Palpable radial pulse.      Imaging  No new imaging obtained today.    Assessment and Plan  48-year-old right-hand dominant male is 6 weeks and 1 day status post right carpal tunnel release.  He is having no issues with his hand.  He has returned to full functional activities.  He was no longer experiencing numbness.  He is very happy with his outcome.  He presents for  evaluation of left hand pain and numbness today.  He was findings consistent with left carpal and cubital tunnel syndrome.  I discussed operative and nonoperative treatment options for both of these issues.  Given patient's outcome on the right side, he would like to go ahead and proceed with surgery for the left side.  I discussed risks of surgery including infection, injury to nerve or blood vessels, incomplete relief of pain, recurrence of symptoms, need for further surgery, wound healing issues, nerve instability, hematoma, wound dehiscence, stiffness, numbness, pillar pain, incomplete release.  Patient agreed to proceed and signed consent for surgery.  Consent was obtained for left carpal tunnel release and left cubital tunnel release with possible ulnar nerve transposition.  Patient was booked for 07/16/2024.  I discussed with the patient that I will be on maternity leave following his surgery so his follow up appointments will be with someone else for the period of time that I am absent.    Allie Esparza MD  Orthopaedic Hand Surgery

## 2024-07-09 ENCOUNTER — PATIENT MESSAGE (OUTPATIENT)
Dept: PREADMISSION TESTING | Facility: HOSPITAL | Age: 49
End: 2024-07-09
Payer: COMMERCIAL

## 2024-07-10 ENCOUNTER — ANESTHESIA EVENT (OUTPATIENT)
Dept: SURGERY | Facility: HOSPITAL | Age: 49
End: 2024-07-10
Payer: COMMERCIAL

## 2024-07-10 NOTE — ANESTHESIA PREPROCEDURE EVALUATION
07/10/2024  Gabe Linn is a 48 y.o., male.      Pre-op Assessment    I have reviewed the Patient Summary Reports.    I have reviewed the NPO Status.   I have reviewed the Medications.     Review of Systems  Anesthesia Hx:   History of prior surgery of interest to airway management or planning:             Social:  Former Smoker       Hematology/Oncology:  Hematology Normal                                     Cardiovascular:  Cardiovascular Normal                                            Pulmonary:  Pulmonary Normal                       Renal/:  Renal/ Normal                 Hepatic/GI:  Hepatic/GI Normal                 Endocrine:  Endocrine Normal                Physical Exam  General: Well nourished    Airway:  Mallampati: II   Mouth Opening: Normal  TM Distance: 4 - 6 cm  Tongue: Normal  Neck ROM: Normal ROM    Dental:  Intact        Anesthesia Plan  Type of Anesthesia, risks & benefits discussed:    Anesthesia Type: Gen Supraglottic Airway, Gen ETT  Intra-op Monitoring Plan: Standard ASA Monitors  Post Op Pain Control Plan: multimodal analgesia and IV/PO Opioids PRN  Induction:  IV  Informed Consent: Informed consent signed with the Patient and all parties understand the risks and agree with anesthesia plan.  All questions answered. Patient consented to blood products? No  ASA Score: 1  Day of Surgery Review of History & Physical: H&P Update referred to the surgeon/provider.    Ready For Surgery From Anesthesia Perspective.     .

## 2024-07-15 ENCOUNTER — PATIENT MESSAGE (OUTPATIENT)
Dept: PREADMISSION TESTING | Facility: HOSPITAL | Age: 49
End: 2024-07-15
Payer: COMMERCIAL

## 2024-07-15 ENCOUNTER — LAB VISIT (OUTPATIENT)
Dept: LAB | Facility: HOSPITAL | Age: 49
End: 2024-07-15
Attending: STUDENT IN AN ORGANIZED HEALTH CARE EDUCATION/TRAINING PROGRAM
Payer: COMMERCIAL

## 2024-07-15 DIAGNOSIS — Z01.818 PREOPERATIVE TESTING: ICD-10-CM

## 2024-07-15 LAB
ANION GAP SERPL CALC-SCNC: 8 MMOL/L (ref 8–16)
BASOPHILS # BLD AUTO: 0.02 K/UL (ref 0–0.2)
BASOPHILS NFR BLD: 0.4 % (ref 0–1.9)
BUN SERPL-MCNC: 6 MG/DL (ref 6–20)
CALCIUM SERPL-MCNC: 9.6 MG/DL (ref 8.7–10.5)
CHLORIDE SERPL-SCNC: 106 MMOL/L (ref 95–110)
CO2 SERPL-SCNC: 26 MMOL/L (ref 23–29)
CREAT SERPL-MCNC: 0.8 MG/DL (ref 0.5–1.4)
DIFFERENTIAL METHOD BLD: ABNORMAL
EOSINOPHIL # BLD AUTO: 0.1 K/UL (ref 0–0.5)
EOSINOPHIL NFR BLD: 1.3 % (ref 0–8)
ERYTHROCYTE [DISTWIDTH] IN BLOOD BY AUTOMATED COUNT: 13 % (ref 11.5–14.5)
EST. GFR  (NO RACE VARIABLE): >60 ML/MIN/1.73 M^2
GLUCOSE SERPL-MCNC: 98 MG/DL (ref 70–110)
HCT VFR BLD AUTO: 49.6 % (ref 40–54)
HGB BLD-MCNC: 16.4 G/DL (ref 14–18)
IMM GRANULOCYTES # BLD AUTO: 0.02 K/UL (ref 0–0.04)
IMM GRANULOCYTES NFR BLD AUTO: 0.4 % (ref 0–0.5)
LYMPHOCYTES # BLD AUTO: 1.9 K/UL (ref 1–4.8)
LYMPHOCYTES NFR BLD: 35.9 % (ref 18–48)
MCH RBC QN AUTO: 34.5 PG (ref 27–31)
MCHC RBC AUTO-ENTMCNC: 33.1 G/DL (ref 32–36)
MCV RBC AUTO: 104 FL (ref 82–98)
MONOCYTES # BLD AUTO: 0.4 K/UL (ref 0.3–1)
MONOCYTES NFR BLD: 7.4 % (ref 4–15)
NEUTROPHILS # BLD AUTO: 3 K/UL (ref 1.8–7.7)
NEUTROPHILS NFR BLD: 54.6 % (ref 38–73)
NRBC BLD-RTO: 0 /100 WBC
PLATELET # BLD AUTO: 197 K/UL (ref 150–450)
PMV BLD AUTO: 11 FL (ref 9.2–12.9)
POTASSIUM SERPL-SCNC: 4.2 MMOL/L (ref 3.5–5.1)
RBC # BLD AUTO: 4.75 M/UL (ref 4.6–6.2)
SODIUM SERPL-SCNC: 140 MMOL/L (ref 136–145)
WBC # BLD AUTO: 5.41 K/UL (ref 3.9–12.7)

## 2024-07-15 PROCEDURE — 85025 COMPLETE CBC W/AUTO DIFF WBC: CPT | Performed by: STUDENT IN AN ORGANIZED HEALTH CARE EDUCATION/TRAINING PROGRAM

## 2024-07-15 PROCEDURE — 80048 BASIC METABOLIC PNL TOTAL CA: CPT | Performed by: STUDENT IN AN ORGANIZED HEALTH CARE EDUCATION/TRAINING PROGRAM

## 2024-07-15 PROCEDURE — 36415 COLL VENOUS BLD VENIPUNCTURE: CPT | Mod: PN | Performed by: STUDENT IN AN ORGANIZED HEALTH CARE EDUCATION/TRAINING PROGRAM

## 2024-07-16 ENCOUNTER — HOSPITAL ENCOUNTER (OUTPATIENT)
Facility: HOSPITAL | Age: 49
Discharge: HOME | End: 2024-07-16
Attending: STUDENT IN AN ORGANIZED HEALTH CARE EDUCATION/TRAINING PROGRAM | Admitting: STUDENT IN AN ORGANIZED HEALTH CARE EDUCATION/TRAINING PROGRAM
Payer: COMMERCIAL

## 2024-07-16 ENCOUNTER — ANESTHESIA (OUTPATIENT)
Dept: SURGERY | Facility: HOSPITAL | Age: 49
End: 2024-07-16
Payer: COMMERCIAL

## 2024-07-16 VITALS
SYSTOLIC BLOOD PRESSURE: 114 MMHG | BODY MASS INDEX: 34.01 KG/M2 | OXYGEN SATURATION: 98 % | HEIGHT: 71 IN | TEMPERATURE: 98 F | RESPIRATION RATE: 19 BRPM | WEIGHT: 242.94 LBS | HEART RATE: 74 BPM | DIASTOLIC BLOOD PRESSURE: 73 MMHG

## 2024-07-16 DIAGNOSIS — G56.02 CARPAL TUNNEL SYNDROME OF LEFT WRIST: ICD-10-CM

## 2024-07-16 DIAGNOSIS — G56.02 LEFT CARPAL TUNNEL SYNDROME: ICD-10-CM

## 2024-07-16 DIAGNOSIS — G56.22 CUBITAL TUNNEL SYNDROME ON LEFT: Primary | ICD-10-CM

## 2024-07-16 PROCEDURE — 63600175 PHARM REV CODE 636 W HCPCS: Performed by: NURSE ANESTHETIST, CERTIFIED REGISTERED

## 2024-07-16 PROCEDURE — C1729 CATH, DRAINAGE: HCPCS | Performed by: STUDENT IN AN ORGANIZED HEALTH CARE EDUCATION/TRAINING PROGRAM

## 2024-07-16 PROCEDURE — 37000008 HC ANESTHESIA 1ST 15 MINUTES: Performed by: STUDENT IN AN ORGANIZED HEALTH CARE EDUCATION/TRAINING PROGRAM

## 2024-07-16 PROCEDURE — 25000003 PHARM REV CODE 250: Performed by: NURSE ANESTHETIST, CERTIFIED REGISTERED

## 2024-07-16 PROCEDURE — 36000707: Performed by: STUDENT IN AN ORGANIZED HEALTH CARE EDUCATION/TRAINING PROGRAM

## 2024-07-16 PROCEDURE — 71000033 HC RECOVERY, INTIAL HOUR: Performed by: STUDENT IN AN ORGANIZED HEALTH CARE EDUCATION/TRAINING PROGRAM

## 2024-07-16 PROCEDURE — 25000003 PHARM REV CODE 250: Performed by: STUDENT IN AN ORGANIZED HEALTH CARE EDUCATION/TRAINING PROGRAM

## 2024-07-16 PROCEDURE — 71000015 HC POSTOP RECOV 1ST HR: Performed by: STUDENT IN AN ORGANIZED HEALTH CARE EDUCATION/TRAINING PROGRAM

## 2024-07-16 PROCEDURE — 36000706: Performed by: STUDENT IN AN ORGANIZED HEALTH CARE EDUCATION/TRAINING PROGRAM

## 2024-07-16 PROCEDURE — 63600175 PHARM REV CODE 636 W HCPCS: Mod: JZ,JG | Performed by: STUDENT IN AN ORGANIZED HEALTH CARE EDUCATION/TRAINING PROGRAM

## 2024-07-16 PROCEDURE — 64721 CARPAL TUNNEL SURGERY: CPT | Mod: 79,51,LT, | Performed by: STUDENT IN AN ORGANIZED HEALTH CARE EDUCATION/TRAINING PROGRAM

## 2024-07-16 PROCEDURE — D9220A PRA ANESTHESIA: Mod: ,,, | Performed by: NURSE ANESTHETIST, CERTIFIED REGISTERED

## 2024-07-16 PROCEDURE — 37000009 HC ANESTHESIA EA ADD 15 MINS: Performed by: STUDENT IN AN ORGANIZED HEALTH CARE EDUCATION/TRAINING PROGRAM

## 2024-07-16 PROCEDURE — 64718 REVISE ULNAR NERVE AT ELBOW: CPT | Mod: 79,LT,, | Performed by: STUDENT IN AN ORGANIZED HEALTH CARE EDUCATION/TRAINING PROGRAM

## 2024-07-16 RX ORDER — BUPIVACAINE HYDROCHLORIDE 2.5 MG/ML
INJECTION, SOLUTION EPIDURAL; INFILTRATION; INTRACAUDAL
Status: DISCONTINUED
Start: 2024-07-16 | End: 2024-07-16 | Stop reason: HOSPADM

## 2024-07-16 RX ORDER — LIDOCAINE HYDROCHLORIDE 10 MG/ML
INJECTION, SOLUTION EPIDURAL; INFILTRATION; INTRACAUDAL; PERINEURAL
Status: DISCONTINUED
Start: 2024-07-16 | End: 2024-07-16 | Stop reason: HOSPADM

## 2024-07-16 RX ORDER — NAPROXEN 500 MG/1
500 TABLET ORAL EVERY 8 HOURS PRN
Qty: 30 TABLET | Refills: 0 | Status: SHIPPED | OUTPATIENT
Start: 2024-07-16

## 2024-07-16 RX ORDER — BACITRACIN ZINC 500 [USP'U]/G
OINTMENT TOPICAL
Status: DISCONTINUED
Start: 2024-07-16 | End: 2024-07-16 | Stop reason: HOSPADM

## 2024-07-16 RX ORDER — PROPOFOL 10 MG/ML
INJECTION, EMULSION INTRAVENOUS
Status: DISCONTINUED | OUTPATIENT
Start: 2024-07-16 | End: 2024-07-16

## 2024-07-16 RX ORDER — DEXAMETHASONE SODIUM PHOSPHATE 4 MG/ML
INJECTION, SOLUTION INTRA-ARTICULAR; INTRALESIONAL; INTRAMUSCULAR; INTRAVENOUS; SOFT TISSUE
Status: DISCONTINUED | OUTPATIENT
Start: 2024-07-16 | End: 2024-07-16

## 2024-07-16 RX ORDER — OXYCODONE AND ACETAMINOPHEN 5; 325 MG/1; MG/1
1 TABLET ORAL
Status: DISCONTINUED | OUTPATIENT
Start: 2024-07-16 | End: 2024-07-16 | Stop reason: HOSPADM

## 2024-07-16 RX ORDER — FENTANYL CITRATE 50 UG/ML
INJECTION, SOLUTION INTRAMUSCULAR; INTRAVENOUS
Status: DISCONTINUED | OUTPATIENT
Start: 2024-07-16 | End: 2024-07-16

## 2024-07-16 RX ORDER — GLUCAGON 1 MG
1 KIT INJECTION
Status: DISCONTINUED | OUTPATIENT
Start: 2024-07-16 | End: 2024-07-16 | Stop reason: HOSPADM

## 2024-07-16 RX ORDER — BACITRACIN ZINC 500 UNIT/G
OINTMENT (GRAM) TOPICAL
Status: DISCONTINUED | OUTPATIENT
Start: 2024-07-16 | End: 2024-07-16 | Stop reason: HOSPADM

## 2024-07-16 RX ORDER — MEPERIDINE HYDROCHLORIDE 25 MG/ML
12.5 INJECTION INTRAMUSCULAR; INTRAVENOUS; SUBCUTANEOUS ONCE AS NEEDED
Status: DISCONTINUED | OUTPATIENT
Start: 2024-07-16 | End: 2024-07-16 | Stop reason: HOSPADM

## 2024-07-16 RX ORDER — SODIUM CHLORIDE 9 MG/ML
INJECTION, SOLUTION INTRAVENOUS CONTINUOUS
Status: DISCONTINUED | OUTPATIENT
Start: 2024-07-16 | End: 2024-07-16 | Stop reason: HOSPADM

## 2024-07-16 RX ORDER — ACETAMINOPHEN 500 MG
500 TABLET ORAL EVERY 8 HOURS PRN
Qty: 30 TABLET | Refills: 0 | Status: SHIPPED | OUTPATIENT
Start: 2024-07-16

## 2024-07-16 RX ORDER — SODIUM CHLORIDE, SODIUM LACTATE, POTASSIUM CHLORIDE, CALCIUM CHLORIDE 600; 310; 30; 20 MG/100ML; MG/100ML; MG/100ML; MG/100ML
INJECTION, SOLUTION INTRAVENOUS CONTINUOUS PRN
Status: DISCONTINUED | OUTPATIENT
Start: 2024-07-16 | End: 2024-07-16

## 2024-07-16 RX ORDER — ONDANSETRON HYDROCHLORIDE 2 MG/ML
4 INJECTION, SOLUTION INTRAVENOUS DAILY PRN
Status: DISCONTINUED | OUTPATIENT
Start: 2024-07-16 | End: 2024-07-16 | Stop reason: HOSPADM

## 2024-07-16 RX ORDER — LIDOCAINE HYDROCHLORIDE 10 MG/ML
1 INJECTION, SOLUTION EPIDURAL; INFILTRATION; INTRACAUDAL; PERINEURAL ONCE
Status: DISCONTINUED | OUTPATIENT
Start: 2024-07-16 | End: 2024-07-16 | Stop reason: HOSPADM

## 2024-07-16 RX ORDER — LIDOCAINE HYDROCHLORIDE 20 MG/ML
INJECTION INTRAVENOUS
Status: DISCONTINUED | OUTPATIENT
Start: 2024-07-16 | End: 2024-07-16

## 2024-07-16 RX ORDER — MIDAZOLAM HYDROCHLORIDE 1 MG/ML
INJECTION INTRAMUSCULAR; INTRAVENOUS
Status: DISCONTINUED | OUTPATIENT
Start: 2024-07-16 | End: 2024-07-16

## 2024-07-16 RX ORDER — FENTANYL CITRATE 50 UG/ML
25 INJECTION, SOLUTION INTRAMUSCULAR; INTRAVENOUS EVERY 5 MIN PRN
Status: DISCONTINUED | OUTPATIENT
Start: 2024-07-16 | End: 2024-07-16 | Stop reason: HOSPADM

## 2024-07-16 RX ORDER — LIDOCAINE HYDROCHLORIDE 10 MG/ML
INJECTION, SOLUTION EPIDURAL; INFILTRATION; INTRACAUDAL; PERINEURAL
Status: DISCONTINUED | OUTPATIENT
Start: 2024-07-16 | End: 2024-07-16 | Stop reason: HOSPADM

## 2024-07-16 RX ORDER — BUPIVACAINE HYDROCHLORIDE 2.5 MG/ML
INJECTION, SOLUTION EPIDURAL; INFILTRATION; INTRACAUDAL
Status: DISCONTINUED | OUTPATIENT
Start: 2024-07-16 | End: 2024-07-16 | Stop reason: HOSPADM

## 2024-07-16 RX ORDER — ONDANSETRON HYDROCHLORIDE 2 MG/ML
INJECTION, SOLUTION INTRAVENOUS
Status: DISCONTINUED | OUTPATIENT
Start: 2024-07-16 | End: 2024-07-16

## 2024-07-16 RX ORDER — DEXMEDETOMIDINE HYDROCHLORIDE 100 UG/ML
INJECTION, SOLUTION INTRAVENOUS
Status: DISCONTINUED | OUTPATIENT
Start: 2024-07-16 | End: 2024-07-16

## 2024-07-16 RX ADMIN — DEXTROSE 2 G: 50 INJECTION, SOLUTION INTRAVENOUS at 09:07

## 2024-07-16 RX ADMIN — FENTANYL CITRATE 50 MCG: 50 INJECTION, SOLUTION INTRAMUSCULAR; INTRAVENOUS at 11:07

## 2024-07-16 RX ADMIN — PROPOFOL 200 MG: 10 INJECTION, EMULSION INTRAVENOUS at 09:07

## 2024-07-16 RX ADMIN — MIDAZOLAM HYDROCHLORIDE 2 MG: 1 INJECTION INTRAMUSCULAR; INTRAVENOUS at 09:07

## 2024-07-16 RX ADMIN — LIDOCAINE HYDROCHLORIDE 50 MG: 20 INJECTION INTRAVENOUS at 09:07

## 2024-07-16 RX ADMIN — ONDANSETRON 4 MG: 2 INJECTION INTRAMUSCULAR; INTRAVENOUS at 10:07

## 2024-07-16 RX ADMIN — FENTANYL CITRATE 50 MCG: 50 INJECTION, SOLUTION INTRAMUSCULAR; INTRAVENOUS at 10:07

## 2024-07-16 RX ADMIN — FENTANYL CITRATE 50 MCG: 50 INJECTION, SOLUTION INTRAMUSCULAR; INTRAVENOUS at 09:07

## 2024-07-16 RX ADMIN — SODIUM CHLORIDE, SODIUM LACTATE, POTASSIUM CHLORIDE, AND CALCIUM CHLORIDE: 600; 310; 30; 20 INJECTION, SOLUTION INTRAVENOUS at 09:07

## 2024-07-16 RX ADMIN — DEXAMETHASONE SODIUM PHOSPHATE 4 MG: 4 INJECTION, SOLUTION INTRA-ARTICULAR; INTRALESIONAL; INTRAMUSCULAR; INTRAVENOUS; SOFT TISSUE at 09:07

## 2024-07-16 RX ADMIN — DEXMEDETOMIDINE HYDROCHLORIDE 4 MCG: 100 INJECTION, SOLUTION INTRAVENOUS at 10:07

## 2024-07-16 NOTE — ANESTHESIA PROCEDURE NOTES
Intubation    Date/Time: 7/16/2024 9:52 AM    Performed by: Sandy Allan CRNA  Authorized by: Joseph Lopez MD    Intubation:     Induction:  Intravenous    Intubated:  Postinduction    Mask Ventilation:  Easy mask    Attempts:  1    Attempted By:  CRNA    Difficult Airway Encountered?: No      Complications:  None    Airway Device:  Supraglottic airway/LMA    Airway Device Size:  5.0    Placement Verified By:  Capnometry    Complicating Factors:  None    Findings Post-Intubation:  BS equal bilateral

## 2024-07-16 NOTE — H&P
Hand Surgery Clinic Note     Chief Complaint      Chief Complaint   Patient presents with    Right Hand - Post-op Evaluation     Left hand pain and numbness         History of Present Illness  Surgery Date: 4/23/24  Surgery: Right carpal tunnel release     48-year-old right-hand dominant male presents for follow up.  He underwent right carpal tunnel release 6 weeks and 1 day ago.  With regard to his right-hand, he was doing great.  He was returned to full function at work and it was not having any issues.  He does not have any numbness in the right hand.  He does not have any night pain in the right hand.  No fevers or chills.     He would like to be evaluated for left hand pain and numbness today.  He has numbness in all 5 fingers which wakes him up every night.  Symptoms have taken place for approximately 7 months.  He received bilateral carpal tunnel steroid injections on 11/29/2023 by another physician.  These injections improved his symptoms for 5 weeks but they subsequently returned.  He will sometimes experience numbness in his left hand during the daytime with certain activities such as driving or holding his from.  It was not experience numbness while at rest.     Review of Systems  Review of systems negative for chest pain, shortness of breath, fevers, chills, nausea/vomiting.     Past Medical History  History reviewed. No pertinent past medical history.     Past Surgical History        Past Surgical History:   Procedure Laterality Date    CARPAL TUNNEL RELEASE Right 4/23/2024     Procedure: RELEASE, CARPAL TUNNEL;  Surgeon: Allie Esparza MD;  Location: [facility];  Service: Orthopedics;  Laterality: Right;    CYST REMOVAL             Allergies  Review of patient's allergies indicates:  No Known Allergies     Family History         Family History   Problem Relation Name Age of Onset    Cancer Mother        Cancer Father             Social History  Social History            Socioeconomic History     Marital status:    Tobacco Use    Smoking status: Former       Types: Cigarettes       Passive exposure: Past    Smokeless tobacco: Never   Substance and Sexual Activity    Alcohol use: Not Currently    Drug use: Never         Vital Signs  There were no vitals filed for this visit.     Physical Exam  Constitutional: Appears well-developed and well-nourished. No distress.   HENT:   Head: Normocephalic.   Eyes: EOM are normal.   Pulmonary/Chest: Effort normal.   Neurological: Oriented to person, place, and time.   Psychiatric: Normal mood and affect.      Right Upper Extremity:  Incision is well healed.  No drainage.  No erythema.  No swelling.  No tenderness over the incision.  Full active wrist flexion and extension.  Full pronation and supination.  Patient is able to make a fist and extend all his fingers.  Two-point discrimination is 5 mm in all 5 fingers.  Palpable radial pulse.     Left Upper Extremity:  No abrasions, lacerations, wounds.  No swelling.  No erythema.  Full active elbow flexion and extension. Full active wrist flexion and extension.  Full pronation and supination. Positive Tinel's in the median nerve distribution at the wrist.  Positive Phalen's.  Positive Tinel's in the ulnar nerve distribution at the elbow.  No ulnar nerve subluxation with elbow flexion.  Positive elbow flexion test. No thenar or FDI atrophy.  5/5 apb strength.  5/5 FDI strength.  Two-point discrimination: 7/7/9/5/9.  Patient is able to make a fist and extend all his fingers.  No tenderness over the A1 pulleys of all 5 fingers.  No triggering with range of motion.  Negative froments. Negative Wartenbergs. Palpable radial pulse.        Imaging  No new imaging obtained today.     Assessment and Plan  48-year-old right-hand dominant male is 6 weeks and 1 day status post right carpal tunnel release.  He is having no issues with his hand.  He has returned to full functional activities.  He was no longer experiencing numbness.   He is very happy with his outcome.  He presents for evaluation of left hand pain and numbness today.  He was findings consistent with left carpal and cubital tunnel syndrome.  I discussed operative and nonoperative treatment options for both of these issues.  Given patient's outcome on the right side, he would like to go ahead and proceed with surgery for the left side.  I discussed risks of surgery including infection, injury to nerve or blood vessels, incomplete relief of pain, recurrence of symptoms, need for further surgery, wound healing issues, nerve instability, hematoma, wound dehiscence, stiffness, numbness, pillar pain, incomplete release.  Patient agreed to proceed and signed consent for surgery.  Consent was obtained for left carpal tunnel release and left cubital tunnel release with possible ulnar nerve transposition.  Patient was booked for 07/16/2024.  I discussed with the patient that I will be on maternity leave following his surgery so his follow up appointments will be with someone else for the period of time that I am absent.     Allie Esparza MD  Orthopaedic Hand Surgery

## 2024-07-16 NOTE — DISCHARGE SUMMARY
[facility]  Discharge Note  Short Stay    Procedure(s) (LRB):  RELEASE, CUBITAL TUNNEL (Left)  RELEASE, CARPAL TUNNEL (Left)  TRANSPOSITION, NERVE, ULNAR (Left)      OUTCOME: Patient tolerated treatment/procedure well without complication and is now ready for discharge.    DISPOSITION: Home or Self Care    FINAL DIAGNOSIS:  Left carpal tunnel syndrome, left cubital tunnel syndrome    FOLLOWUP: In clinic    DISCHARGE INSTRUCTIONS:    Discharge Procedure Orders   Diet Adult Regular     Leave dressing on - Keep it clean, dry, and intact until clinic visit     Weight bearing restrictions (specify):   Order Comments: No lifting greater than 1 lb to the operative extremity for 2 weeks. Work on gentle finger range of motion. Work on gentle elbow range of motion if in a soft dressing.        TIME SPENT ON DISCHARGE: 5 minutes

## 2024-07-16 NOTE — TRANSFER OF CARE
"Anesthesia Transfer of Care Note    Patient: Gabe Linn    Procedure(s) Performed: Procedure(s) (LRB):  RELEASE, CUBITAL TUNNEL (Left)  RELEASE, CARPAL TUNNEL (Left)  TRANSPOSITION, NERVE, ULNAR (Left)    Patient location: PACU    Anesthesia Type: general    Transport from OR: Transported from OR on room air with adequate spontaneous ventilation    Post pain: adequate analgesia    Post assessment: no apparent anesthetic complications and tolerated procedure well    Post vital signs: stable    Level of consciousness: awake and alert    Nausea/Vomiting: no nausea/vomiting    Complications: none    Transfer of care protocol was followed      Last vitals: Visit Vitals  /76 (BP Location: Right arm, Patient Position: Lying)   Pulse 85   Temp 36.7 °C (98.1 °F) (Temporal)   Resp 17   Ht 5' 11" (1.803 m)   Wt 110.2 kg (242 lb 15.2 oz)   SpO2 95%   BMI 33.88 kg/m²     "

## 2024-07-16 NOTE — ANESTHESIA POSTPROCEDURE EVALUATION
Anesthesia Post Evaluation    Patient: Gabe Linn    Procedure(s) Performed: Procedure(s) (LRB):  RELEASE, CUBITAL TUNNEL (Left)  RELEASE, CARPAL TUNNEL (Left)  TRANSPOSITION, NERVE, ULNAR (Left)    Final Anesthesia Type: general      Patient location during evaluation: PACU  Patient participation: Yes- Able to Participate  Level of consciousness: awake  Post-procedure vital signs: reviewed and stable  Pain management: adequate  Airway patency: patent    PONV status at discharge: No PONV  Anesthetic complications: no      Cardiovascular status: stable  Respiratory status: unassisted  Hydration status: euvolemic  Follow-up not needed.              Vitals Value Taken Time   /73 07/16/24 1147   Temp 36.7 °C (98.1 °F) 07/16/24 1140   Pulse 76 07/16/24 1148   Resp 15 07/16/24 1148   SpO2 96 % 07/16/24 1148   Vitals shown include unfiled device data.      No case tracking events are documented in the log.      Pain/Genie Score: Genie Score: 10 (7/16/2024 11:35 AM)

## 2024-07-16 NOTE — DISCHARGE INSTRUCTIONS
After Hand Surgery  After surgery, the better you take care of yourself--especially your hand--the sooner it will heal. Follow your surgeons instructions. Try not to bump your hand, and dont move or lift anything while youre still wearing bandages, a splint, or a cast.  Care for your hand    Keep your hand elevated above heart level as much as possible for the first several days after surgery. This helps reduce swelling and pain.  To help prevent infection and speed healing, take care not to get your cast or bandages wet.  Keep dressing clean, dry, & intact until your follow up appointment.   Relieve pain as directed  Your surgeon may prescribe pain medicine or suggest you take an anti-inflammatory medicine. You might also be instructed to apply ice (or another cold source) to your hand. If you use ice cubes, put them in a plastic bag and rest it on top of your bandages. Leave the cold source on your hand for as long as its comfortable. Do this several times a day for the first few days after surgery. It may take several minutes before you can feel the cold through the cast or bandages.  Follow up with your surgeon  During a follow-up visit after surgery, your surgeon will check your progress. The stitches, bandages, splint, or cast may be removed. A new cast or splint may be placed. If your hand has healed enough, your surgeon may prescribe exercises.  Do prescribed hand exercises  Your surgeon may recommend that you do exercises. These may be done under the guidance of a physical or occupational therapist. The exercises strengthen your hand, help you regain flexibility, and restore proper function. Do the exercises as advised.  Call your surgeon if you have...  A fever higher than 100.4°F (38.0°C) taken by mouth  Side effects from your medicine, such as prolonged nausea  A wet or loose dressing, or a dressing that is too tight  Excessive bleeding  Increased, ongoing pain or numbness  Signs of infection (such  as drainage, warmth, or redness) at the incision site   Date Last Reviewed: 11/11/2015  © 0922-1329 [employer]. [address] [place] [address], [place] [address]. All rights reserved. This information is not intended as a substitute for professional medical care. Always follow your healthcare professional's instructions.

## 2024-07-16 NOTE — OP NOTE
[facility]  Orthopaedic Hand Surgery  Operative Note    SUMMARY     Date of Procedure: 7/16/2024     Procedure:   16224 Left cubital tunnel release with subcutaneous ulnar nerve transposition    45929 Left carpal tunnel release    Surgeons and Role:     * Allie Esparza MD - Primary    Assistant: None    Pre-Operative Diagnosis: Carpal tunnel syndrome of left wrist [G56.02]  Cubital tunnel syndrome on left [G56.22]    Post-Operative Diagnosis: Post-Op Diagnosis Codes:     * Carpal tunnel syndrome of left wrist [G56.02]     * Cubital tunnel syndrome on left [G56.22]    Anesthesia: General, local    Indication for Procedure:  48 year old right hand dominant female presented to clinic with left cubital tunnel syndrome and left carpal tunnel syndrome. He underwent right carpal tunnel release in April of 2024 and recovered well without complications. Risks and benefits of operative versus nonoperative treatment were discussed for patient's left carpal and cubital tunnel syndrome. Patient elected to proceed with surgery. Consent was obtained for left cubital tunnel release with possible ulnar nerve transposition, left carpal tunnel release.    Operative Findings (including complications, if any): Compression of the ulnar nerve at Chanda's ligament; snapping of ulnar nerve over medial epicondyle with elbow flexion following ulnar nerve decompression so subcutaneous transposition performed.    Description of Technical Procedures:   The patient was brought to the operating room and laid supine.  A tourniquet was placed at the left upper arm and the arm prepped with alcohol/chloraprep and draped in the usual sterile surgical fashion.  Preoperative time out was performed with confirmation of correct patient, side, and site, identification of all personnel, confirmation of administration of preoperative antibiotic, dry prep, and confirmation of all needed equipment.  When this was completed, I proceeded  with the surgery.     First, carpal tunnel release was performed. A skin marker was used to tera the relevant landmarks. An esmarch was used to exsanguinate the limb and the tourniquet was raised to 250 mm Hg.  A 15- blade was used to make the incision.  The incision began just distal to the distal wrist crease in line with the radial border of the ring finger. The incision was made through the skin, subcutaneous tissue, palmar fascia down to the transverse carpal ligament. Under direct visualization, the transverse carpal ligament was identified and incised just radial to the hook of the hamate. I sharply incised the entirety of the transverse carpal ligament distally under direct visualization just past the fat surrounding the superficial palmar arch. I then incised the proximal portion of the transverse carpal ligament to and well across the wrist crease to include the distal forearm fascia.  The ligament was sharply divided with care given to avoiding injury to adjacent neurovascular structures.  I were able to confirm a complete release proximally and distally by direct visualization and by palpation- there were no constrictive elements affecting the nerve.      Next, cubital tunnel release was performed. A incision was made between the medial epicondyle and olecranon along the course of the ulnar nerve. I incised sharply through skin and sharply through the subcutaneous tissues proximally. An MABC nerve branch was encountered proximally and protected throughout the case. The ulnar nerve was identified. I sharply incised the overlying fascia and bands over the nerve. I proceeded proximally incising the overlying tissue from the nerve, and identified the arcade of [place] and freed any compressive tissue around the nerve up to 7cm proximal to the medial epicondyle. Once decompressed proximally, I proceeded to incise the fascia over the top of  nerve distally incising the cubital retinaculum and Chanda's  ligament. I incised the FCU fascia over the course of the ulnar nerve. I then bluntly dissected between the heads of FCU and free all tissue over the top of the nerve. From proximal to distal I confirmed the nerve was free of any compression from the surrounding soft tissue. An FCU branch was noted and protected throughout the case. Once fully decompressed throughout its course, the elbow was taken through full ROM; the nerve was noted to sublux over the medial epicondyle with elbow flexion. As such, I proceeded with subcutaneous transposition. The nerve was freed up circumferentially along its course. The articular branch of the ulnar nerve was visualized and ligated. The intermuscular septum was exposed and an approximately 3cm length of it was removed proximal to the medial epicondyle. The flexor pronator mass was exposed. A Z cut was made in the flexor pronator mass to create two fascial flaps. A combination of tenotomies and knife was used to ensure that there would be no compression at the proximal and distal aspects of the epicondyle. A small portion of the conjoint tendon was excised as well to ensure that it did not compress the nerve where it would lie. The ulnar nerve was brought over and the two fascial flaps were placed over the top. Two 3-0 ethibond sutures passed in a pants over vest fashion to bring the two flaps together. A freer was used to confirm that there was plenty of space for the nerve to pass with no constriction. The nerve was assessed proximally and distally along its course and there were no placed where it could potentially be constricted.    Tourniquet was let down. Total tourniquet time was 41 minutes. Meticulous hemostasis was obtained. 10cc of lidocaine 1% without epinephrine mixed with 0.25% marcaine was injected as local anesthetic. Carpal tunnel incision was closed with 4-0 nylon. Cubital tunnel incision was closed with 2-0 vicryl, 4-0 running subcuticular monocryl, dermabond,  and steri strips. Bacitracin, adaptic, 4x4, webril, and a splint was placed. All sponge, needle, and instrument counts were correct at the conclusion of the procedure.  The patient was awakened and taken to the recovery room in stable condition.    Estimated Blood Loss (EBL): 5cc           Implants: * No implants in log *    Specimens:   Specimen (24h ago, onward)      None                    Condition: Good    Disposition: PACU - hemodynamically stable.    Attestation: I was present and scrubbed for the entire procedure.    Allie Esparza MD  Orthopaedic Hand Surgery

## 2024-07-29 NOTE — PROGRESS NOTES
Hand Surgery Clinic Postop Note     Surgery Date: 7/16/2024  Surgery: Left cubital tunnel release with subcutaneous ulnar nerve transposition; Left carpal tunnel release      Postoperative Course:  Patient is 14 days out from surgery. He is doing great. His pain level is a 7/10 today only with elbow flexion, no pain when resting. He does not have any numbness either at night or during the daytime. He has kept his dressing clean and dry. No fevers or chills. No other issues.     Vital Signs  There were no vitals filed for this visit.     Physical Exam  General - NAD  Resp - nonlabored breathing  CV - RR by RP     Left Upper Extremity:  Splint is clean and dry, removed for exam. No swelling. No erythema. No drainage. Full active elbow flexion, limited elbow extension, lacking about 20° of elbow extension; pain with elbow extension. Full pronation and supination. Full active wrist flexion and extension.  Patient is able to make a fist and extend all her fingers. Carpal and cubital tunnel incisions are well healed. Carpal tunnel incision is closed with nylon. Cubital tunnel incision is closed with Monocryl, Dermabond. Two-point discrimination is 5 mm in all 5 fingers.  Sensation is intact in the MABC and radial nerve distributions.  Palpable radial pulse.     Imaging  No new imaging obtained today.     Assessment and Plan  48-year-old RHD male who is 14 days status post left cubital tunnel release with subcutaneous ulnar nerve transposition and Left carpal tunnel release. Doing great today. He does have some stiffness in his left elbow, lacking about 20° of extension. Sutures were removed today from carpal tunnel incision. Okay to shower. Okay to progress to activities as tolerated. No restrictions.   We will get him started in therapy to work on elbow range of motion. External physical therapy referral placed to [facility] and joint physical therapy in [place] to work on elbow ROM, suspect he will only need a few  sessions to gain full ROM back.  Discussed scar massage for the carpal tunnel incision if it becomes rigid/hard over the next couple of weeks.  He is scheduled to return to work on August 26, 2024. Follow up in clinic in 4 weeks for re-evaluation, suspect he will be able to return to work at that visit.           Blanche Juares PA-C  Sports Medicine Physician Assistant       Disclaimer: This note was prepared using a voice recognition system and is likely to have sound alike errors within the text.

## 2024-07-30 ENCOUNTER — OFFICE VISIT (OUTPATIENT)
Dept: SPORTS MEDICINE | Facility: CLINIC | Age: 49
End: 2024-07-30
Payer: COMMERCIAL

## 2024-07-30 VITALS — WEIGHT: 242.94 LBS | BODY MASS INDEX: 34.01 KG/M2 | HEIGHT: 71 IN

## 2024-07-30 DIAGNOSIS — Z98.890 POST-OPERATIVE STATE: Primary | ICD-10-CM

## 2024-07-30 PROCEDURE — 99024 POSTOP FOLLOW-UP VISIT: CPT | Mod: S$GLB,,, | Performed by: PHYSICIAN ASSISTANT

## 2024-07-30 PROCEDURE — 99999 PR PBB SHADOW E&M-EST. PATIENT-LVL III: CPT | Mod: PBBFAC,,, | Performed by: PHYSICIAN ASSISTANT

## 2024-08-23 ENCOUNTER — OFFICE VISIT (OUTPATIENT)
Dept: SPORTS MEDICINE | Facility: CLINIC | Age: 49
End: 2024-08-23
Payer: COMMERCIAL

## 2024-08-23 VITALS — WEIGHT: 242.94 LBS | RESPIRATION RATE: 17 BRPM | BODY MASS INDEX: 34.01 KG/M2 | HEIGHT: 71 IN

## 2024-08-23 DIAGNOSIS — Z98.890 POST-OPERATIVE STATE: Primary | ICD-10-CM

## 2024-08-23 PROCEDURE — 99024 POSTOP FOLLOW-UP VISIT: CPT | Mod: S$GLB,,, | Performed by: PHYSICIAN ASSISTANT

## 2024-08-23 PROCEDURE — 99999 PR PBB SHADOW E&M-EST. PATIENT-LVL III: CPT | Mod: PBBFAC,,, | Performed by: PHYSICIAN ASSISTANT

## 2024-08-23 NOTE — LETTER
August 23, 2024      [facility]  [address] [place] [address]  Phone: [phone]  Fax: [phone]       Patient: Gabe Linn   YOB: 1975  Date of Visit: 08/23/2024    To Whom It May Concern:    Caesar Linn  was at [facility] on 08/23/2024. He is scheduled to attend 2 more physical therapy sessions next week, I would like for him to complete these prior to returning to work. The patient may return to work on 9/2/2024 with no restrictions. If you have any questions or concerns, or if I can be of further assistance, please do not hesitate to contact me.    Sincerely,      Blanche Juares PA-C  Sports Medicine Physician Assistant

## 2024-08-23 NOTE — PROGRESS NOTES
Hand Surgery Clinic Postop Note    Surgery Date: 7/16/2024  Surgery: Left cubital tunnel release with subcutaneous ulnar nerve transposition; Left carpal tunnel release     Postoperative Course:  Patient is 5 weeks and 3 days out from surgery.  He is doing well at this time.  His elbow extension has a improved compared to prior visit.  He is still endorsing pain and weakness in his forearm with lifting greater than 10 lb, he is scheduled to return to work on 08/26/2024.  He does not have any numbness at night or during the daytime.  No fever no chills.  No further complaints.    Vital Signs  Vitals:    08/23/24 1258   Resp: 17       Physical Exam  General - NAD  Resp - nonlabored breathing  CV - RR by RP    Left Upper Extremity:  Cubital tunnel and carpal tunnel incisions are well healed.  There is 1 small area of erythema surrounding the distal cubital tunnel incision, no surrounding induration, fluctuance, drainage or significant swelling.  He is mildly tender to the touch in this area.  He has full elbow extension and flexion.  Full pronation and supination.  Full active wrist flexion and extension.  He is able to make a fist and extend all of his fingers.  His 2 point discrimination is 5 mm in all 5 fingers.  Sensation is intact in the MA BC and radial nerve distributions.  Palpable radial pulse    Imaging  No new imaging today    Assessment and Plan  48-year-old RHD male who is about 6 weeks out from a left cubital tunnel release with subcutaneous ulnar nerve transposition and left carpal tunnel release. He is doing well at this time. At last visit he had limited elbow extension and pain with range of motion, this has since been improved since he has been active in physical therapy. His elbow range of motion is full at this time. His incisions are well healed.  He does have a small area of erythema surrounding the distal cubital tunnel incision that has mildly tender to the touch. There is no induration,  fluctuance, drainage, or significant swelling, I have no concerns for an infection at this time. He does still endorse some weakness and pain in his forearm with lifting greater than 10 lb, I would like for him to complete 1 more week of occupational therapy to help improve this deficit.  He was scheduled to return to work on 08/26/2024. I would like for him to complete this week of therapy prior to returning to work, new return to work date of 09/02/2024, he is able to return to work at that time with no restrictions. I would like for him to return to clinic 1 more time at 3 months postop. Follow up appointment scheduled with Dr. Esparza on 10/16/2024.        Blanche Juares PA-C  Sports Medicine Physician Assistant       Disclaimer: This note was prepared using a voice recognition system and is likely to have sound alike errors within the text.

## 2024-10-09 ENCOUNTER — TELEPHONE (OUTPATIENT)
Dept: ORTHOPEDICS | Facility: CLINIC | Age: 49
End: 2024-10-09
Payer: COMMERCIAL

## 2024-10-09 NOTE — TELEPHONE ENCOUNTER
Scheduled with Dr. Esparza on 11/1/24. Patient verbally understood and was grateful for the call.     ----- Message from Eliz sent at 10/9/2024 10:41 AM CDT -----  Contact: AIXA QUINN [[id number]]  ..Type:  Patient Requesting Call    Who Called:AIXA QUINN [[id number]]  Does the patient know what this is regarding?: reschedule post op  Would the patient rather a call back or a response via [employer]? call  Best Call Back Number:.[phone] (home)     Additional Information:

## 2024-11-01 ENCOUNTER — OFFICE VISIT (OUTPATIENT)
Dept: ORTHOPEDICS | Facility: CLINIC | Age: 49
End: 2024-11-01
Payer: COMMERCIAL

## 2024-11-01 VITALS — WEIGHT: 242.94 LBS | BODY MASS INDEX: 34.01 KG/M2 | HEIGHT: 71 IN

## 2024-11-01 DIAGNOSIS — G56.02 CARPAL TUNNEL SYNDROME OF LEFT WRIST: Primary | ICD-10-CM

## 2024-11-01 DIAGNOSIS — M18.12 ARTHRITIS OF CARPOMETACARPAL (CMC) JOINT OF LEFT THUMB: ICD-10-CM

## 2024-11-01 DIAGNOSIS — G56.22 CUBITAL TUNNEL SYNDROME ON LEFT: ICD-10-CM

## 2024-11-01 PROCEDURE — 97760 ORTHOTIC MGMT&TRAING 1ST ENC: CPT | Mod: S$GLB,,, | Performed by: STUDENT IN AN ORGANIZED HEALTH CARE EDUCATION/TRAINING PROGRAM

## 2024-11-01 PROCEDURE — 99213 OFFICE O/P EST LOW 20 MIN: CPT | Mod: 24,S$GLB,, | Performed by: STUDENT IN AN ORGANIZED HEALTH CARE EDUCATION/TRAINING PROGRAM

## 2024-11-01 PROCEDURE — 99999 PR PBB SHADOW E&M-EST. PATIENT-LVL III: CPT | Mod: PBBFAC,,, | Performed by: STUDENT IN AN ORGANIZED HEALTH CARE EDUCATION/TRAINING PROGRAM

## 2024-11-04 NOTE — PROGRESS NOTES
Hand Surgery Clinic Follow Up Note    Chief Complaint  Chief Complaint   Patient presents with    Left Hand - Post-op Evaluation    Left Elbow - Post-op Evaluation       History of Present Illness  48-year-old right-hand dominant male presents for follow up.  He underwent left cubital tunnel release with subcutaneous nerve transposition and left carpal tunnel release on 07/16/2024.  He is now over 3 months out from surgery.  He was doing great.  His pain level is a 0/10.  He does not have pain which wakes him up from sleep at night.  He does not have numbness in his fingers.  He was very happy with his outcome.    He does present with a new issue today.  He has pain at the base of his left thumb intermittently.  It is worse with over activity.  It bothers him about once per month.  He was never been treated for this before.    Review of Systems  Review of systems negative for chest pain, shortness of breath, fevers, chills, nausea/vomiting.    Vital Signs  There were no vitals filed for this visit.    Physical Exam  Constitutional: Appears well-developed and well-nourished. No distress.   HENT:   Head: Normocephalic.   Eyes: EOM are normal.   Pulmonary/Chest: Effort normal.   Neurological: Oriented to person, place, and time.   Psychiatric: Normal mood and affect.     Left Upper Extremity:  Incisions are well healed.  No swelling.  No erythema.  No drainage.  No ecchymosis.  No numbness in the distribution of the medial antebrachial cutaneous nerve.  Full active elbow flexion and extension.  Full pronation and supination.  Full active wrist flexion and extension.  Two-point discrimination is 5 mm in all 5 fingers.  5/5 apb strength.  5/5 FDI strength.  No thenar or FDI atrophy.  Patient does have tenderness at the thumb CMC joint.  No crepitus with motion.  Positive thumb adduction and extension tests.  No tenderness over the 1st dorsal extensor compartment.  Negative Finkelstein's.  No tenderness over the A1  pulleys of all 5 fingers.  No triggering with range of motion.  Palpable radial pulse.    Imaging  No new imaging obtained today.    Assessment and Plan  48-year-old male presents for follow up.  He was 3 months status post left carpal tunnel release and left cubital tunnel release with subcutaneous transposition.  He was doing great.  He was recovered from this.  He does not have any residual numbness or nerve pain.    Today he presents with pain at the left thumb CMC joint.  The pain only occurs about once per month with over activity.  I discussed potential treatment options with the patient.  I fitted patient for a CMC controller plus brace which he can wear as needed during functional activities. At least 10 minutes were spent sizing, fitting, and educating for durable medical equipment application today.  This service was performed under the direction of Allie Esparza MD.  CPT 09358.    Follow up in clinic as needed if symptoms recur or do not resolve.      Allie Esparza MD  Orthopaedic Hand Surgery

## (undated) DEVICE — BNDG COFLEX FOAM LF2 ST 3X5YD

## (undated) DEVICE — SOL 9P NACL IRR PIC IL

## (undated) DEVICE — DRAPE STERI-DRAPE 1000 17X11IN

## (undated) DEVICE — COVER LIGHT HANDLE 80/CA

## (undated) DEVICE — ADHESIVE DERMABOND ADVANCED

## (undated) DEVICE — SYR 10CC LUER LOCK

## (undated) DEVICE — TOURNIQUET SB QC DP 18X4IN

## (undated) DEVICE — UNDERGLOVES BIOGEL PI SZ 7 LF

## (undated) DEVICE — GLOVE SURGEONS ULTRA TOUCH 6.5

## (undated) DEVICE — SPONGE GAUZE 4X4 12 PLY STRL

## (undated) DEVICE — DRAPE THREE-QTR REINF 53X77IN

## (undated) DEVICE — TOWEL OR DISP STRL BLUE 4/PK

## (undated) DEVICE — POSITIONER HEAD DONUT 9IN FOAM

## (undated) DEVICE — NDL SAFETY 22G X 1.5 ECLIPSE

## (undated) DEVICE — SUT 4-0 ETHILON 18 PS-2

## (undated) DEVICE — DRAPE U SPLIT SHEET 54X76IN

## (undated) DEVICE — KIT TURNOVER

## (undated) DEVICE — BLADE SURG #15 CARBON STEEL

## (undated) DEVICE — ALCOHOL 70% ANTISEPTIC ISO 4OZ

## (undated) DEVICE — STRIP MEDI WND CLSR 1/2X4IN

## (undated) DEVICE — SUPPORT ULNA NERVE PROTECTOR

## (undated) DEVICE — BANDAGE ESMARK ELASTIC ST 4X9

## (undated) DEVICE — DRAPE SURG W/TWL 17 5/8X23

## (undated) DEVICE — DRAPE HAND STERILE

## (undated) DEVICE — BANDAGE MATRIX HK LOOP 2IN 5YD

## (undated) DEVICE — SUT 3/0 36IN ETHIBOND EXTRA

## (undated) DEVICE — APPLICATOR CHLORAPREP ORN 26ML

## (undated) DEVICE — COVER CAMERA OPERATING ROOM

## (undated) DEVICE — DRAIN PENROSE XRAY 18 X 1/4 ST

## (undated) DEVICE — Device

## (undated) DEVICE — DRESSING N ADH OIL EMUL 3X3

## (undated) DEVICE — CORD BIPOLAR ELECTROSURGICAL

## (undated) DEVICE — DRESSING TRANS 2X2 TEGADERM

## (undated) DEVICE — BANDAGE MATRIX HK LOOP 4IN 5YD

## (undated) DEVICE — CORD CAUTERY BIPOLAR STERILE

## (undated) DEVICE — PACK BASIC SETUP SC BR

## (undated) DEVICE — GOWN FAB REINF SET-IN SLV LG

## (undated) DEVICE — SUT MONOCRYL 4.0 PS2 CP496G

## (undated) DEVICE — PLASTER SPLINT FAST 5INX30IN

## (undated) DEVICE — SUT VICRYL 2-0 J589H 27IN